# Patient Record
Sex: FEMALE | Race: ASIAN | Employment: FULL TIME | URBAN - METROPOLITAN AREA
[De-identification: names, ages, dates, MRNs, and addresses within clinical notes are randomized per-mention and may not be internally consistent; named-entity substitution may affect disease eponyms.]

---

## 2021-12-14 ENCOUNTER — OFFICE VISIT (OUTPATIENT)
Dept: OBGYN | Facility: CLINIC | Age: 21
End: 2021-12-14
Attending: MIDWIFE
Payer: COMMERCIAL

## 2021-12-14 VITALS — DIASTOLIC BLOOD PRESSURE: 71 MMHG | SYSTOLIC BLOOD PRESSURE: 110 MMHG | HEART RATE: 79 BPM | WEIGHT: 103 LBS

## 2021-12-14 DIAGNOSIS — Z30.017 INSERTION OF NEXPLANON: Primary | ICD-10-CM

## 2021-12-14 DIAGNOSIS — Z30.017 INSERTION OF IMPLANTABLE SUBDERMAL CONTRACEPTIVE: ICD-10-CM

## 2021-12-14 LAB
HCG UR QL: NEGATIVE
INTERNAL QC OK POCT: NORMAL
POCT KIT EXPIRATION DATE: NEGATIVE
POCT KIT LOT NUMBER: NEGATIVE

## 2021-12-14 PROCEDURE — 87591 N.GONORRHOEAE DNA AMP PROB: CPT | Performed by: MIDWIFE

## 2021-12-14 PROCEDURE — 87491 CHLMYD TRACH DNA AMP PROBE: CPT | Performed by: MIDWIFE

## 2021-12-14 PROCEDURE — 11981 INSERTION DRUG DLVR IMPLANT: CPT | Performed by: MIDWIFE

## 2021-12-14 PROCEDURE — G0463 HOSPITAL OUTPT CLINIC VISIT: HCPCS | Mod: 25

## 2021-12-14 PROCEDURE — 250N000011 HC RX IP 250 OP 636: Performed by: MIDWIFE

## 2021-12-14 PROCEDURE — 99202 OFFICE O/P NEW SF 15 MIN: CPT | Mod: 25 | Performed by: MIDWIFE

## 2021-12-14 PROCEDURE — 81025 URINE PREGNANCY TEST: CPT | Performed by: MIDWIFE

## 2021-12-14 RX ADMIN — ETONOGESTREL 68 MG: 68 IMPLANT SUBCUTANEOUS at 13:37

## 2021-12-14 NOTE — LETTER
12/14/2021       RE: Eula Lopez  [address] [address]     Dear Colleague,    Thank you for referring your patient, Eula Lopez, to the [facility] [place] at [facility]. Please see a copy of my visit note below.    Nexplanon Insertion:    Is a pregnancy test required: Yes.  Was it positive or negative?  Negative  Was a consent obtained?  Yes    Subjective: Eula Lopez is a 21 year old No obstetric history on file. presents for Nexplanon.    Patient has been given the opportunity to ask questions about all forms of birth control, including all options appropriate for Eula Lopez. Discussed that no method of birth control, except abstinence is 100% effective against pregnancy or sexually transmitted infection.     Eula Lopez understands she may have the Nexplanon removed at any time and it should be removed by a health care provider.    The entire insertion procedure was reviewed with the patient, including care after placement.    Patient's last menstrual period was 11/21/2021. Last sexual activity: not sexually active . No allergy to betadine or shellfish. Patient desires STD screening  HCG  NEG     /71   Pulse 79   Wt 46.7 kg (103 lb)   LMP 11/21/2021     PROCEDURE NOTE: -- Nexplanon Insertion    Reason for Insertion: contraception and crampy  menses      Patient was placed supine with left arm exposed.  Eric was made 8-10 cm above medial epicondyle and a guiding eric 4 cm above the first.  Arm was prepped with Betadine. Insertion point was anesthetized with 2 mL 1% lidocaine. After stretching the skin with thumb and index finger around the insertion site, skin punctured with the tip of the needle inserted at 30 degrees and then lowered to horizontal position. The needle was then advanced to its full length. Applicator was then stabilized and slider was unlocked. Slider was pulled  back until it stopped and then removed.    Correct placement of the implant was confirmed by palpation in the patient's arm and visualizing the purple top of the obturator.   Bandage and pressure dressing applied to insertion site.    Lot # [id number]  Exp: 11/19/23    EBL: minimal    Complications: none    ASSESSMENT:     ICD-10-CM    1. Insertion of Nexplanon  Z30.017 hCG qual urine POCT     Gonorrhea PCR     Chlamydia PCR (Clinic Collect)        PLAN:    Given 's handouts, including when to have Nexplanon removed, list of danger s/sx, side effects and follow up recommended. Encouraged condom use for prevention of STD. Back up contraception advised for 7 days. Advised to call for any fever, for prolonged or severe pain or bleeding, abnormal vaginal dischage. She was advised to use pain medications (ibuprofen) as needed for mild to moderate pain.     RENE Preciado CNM             Again, thank you for allowing me to participate in the care of your patient.      Sincerely,    RENE Preciado CNM

## 2021-12-14 NOTE — LETTER
Date:December 15, 2021      Patient was self referred, no letter generated. Do not send.        [facility] Health Information

## 2021-12-14 NOTE — PROGRESS NOTES
Nexplanon Insertion:    Is a pregnancy test required: Yes.  Was it positive or negative?  Negative  Was a consent obtained?  Yes    Subjective: Eula Lopez is a 21 year old No obstetric history on file. presents for Nexplanon.    Patient has been given the opportunity to ask questions about all forms of birth control, including all options appropriate for Eula Lopez. Discussed that no method of birth control, except abstinence is 100% effective against pregnancy or sexually transmitted infection.     Eula Lopez understands she may have the Nexplanon removed at any time and it should be removed by a health care provider.    The entire insertion procedure was reviewed with the patient, including care after placement.    Patient's last menstrual period was 11/21/2021. Last sexual activity: not sexually active . No allergy to betadine or shellfish. Patient desires STD screening  HCG  NEG     /71   Pulse 79   Wt 46.7 kg (103 lb)   LMP 11/21/2021     PROCEDURE NOTE: -- Nexplanon Insertion    Reason for Insertion: contraception and crampy  menses      Patient was placed supine with left arm exposed.  Eric was made 8-10 cm above medial epicondyle and a guiding eric 4 cm above the first.  Arm was prepped with Betadine. Insertion point was anesthetized with 2 mL 1% lidocaine. After stretching the skin with thumb and index finger around the insertion site, skin punctured with the tip of the needle inserted at 30 degrees and then lowered to horizontal position. The needle was then advanced to its full length. Applicator was then stabilized and slider was unlocked. Slider was pulled back until it stopped and then removed.    Correct placement of the implant was confirmed by palpation in the patient's arm and visualizing the purple top of the obturator.   Bandage and pressure dressing applied to insertion site.    Lot # [id number]  Exp: 11/19/23    EBL: minimal    Complications: none    ASSESSMENT:      ICD-10-CM    1. Insertion of Nexplanon  Z30.017 hCG qual urine POCT     Gonorrhea PCR     Chlamydia PCR (Clinic Collect)        PLAN:    Given 's handouts, including when to have Nexplanon removed, list of danger s/sx, side effects and follow up recommended. Encouraged condom use for prevention of STD. Back up contraception advised for 7 days. Advised to call for any fever, for prolonged or severe pain or bleeding, abnormal vaginal dischage. She was advised to use pain medications (ibuprofen) as needed for mild to moderate pain.     RENE PreciadoM

## 2021-12-15 LAB
C TRACH DNA SPEC QL NAA+PROBE: NEGATIVE
N GONORRHOEA DNA SPEC QL NAA+PROBE: NEGATIVE

## 2022-01-04 ENCOUNTER — TELEPHONE (OUTPATIENT)
Dept: OBGYN | Facility: CLINIC | Age: 22
End: 2022-01-04
Payer: COMMERCIAL

## 2022-01-04 NOTE — TELEPHONE ENCOUNTER
[facility] Call Center    Phone Message    May a detailed message be left on voicemail: yes     Reason for Call: Patient had nexplanon inserted on 12/14/21 and has some questions. Please reach out to patient.    Action Taken: Message routed to:  [facility]    Travel Screening: Not Applicable

## 2022-01-04 NOTE — TELEPHONE ENCOUNTER
Called and spoke with patient. She reports having spotting between cycles after Nexplanon implantation.    Discussed expectations regarding irregular cycles, changes in cycles, missed cycles for first several cycles after implantation. Reviewed bleeding precautions. Pt states spotting is very light. Pt denies any insertion site pain, s/s of infection. Encouraged patient to reach out with any other questions or concerns. Pt verbalized understanding and agreement.

## 2022-03-22 ENCOUNTER — TELEPHONE (OUTPATIENT)
Dept: OBGYN | Facility: CLINIC | Age: 22
End: 2022-03-22
Payer: COMMERCIAL

## 2022-03-22 NOTE — TELEPHONE ENCOUNTER
Patient has been having what feels like menstrual cramps for the past couple days. This is when her cycle would be due to start but she has no bleeding. Last month her period was very light and she had some spotting after. She describes the cramps as fairly mild and intermittent. Patient has been sexually active since nexplanon was placed so took a pregnancy test this AM and it was negative. Also notes acne has been worse recently.     Reviewed that nexplanon can cause irregular menstruation. Patient feels more assured now that she took UPT. Regarding acne, recommended seeing if it improves over time as other factors could be contributing as well and she is otherwise liking the nexplanon. Confirmed she can see a dermatologist and discuss medication options while on nexplanon, just recommended informing derm she has it.

## 2022-03-22 NOTE — TELEPHONE ENCOUNTER
[facility] Call Center    Phone Message    May a detailed message be left on voicemail: yes     Reason for Call: Other: Pt called and stated she has some questions regarding the Nexplanon regarding some side effects - pt wouldnt go into much detail about it but would like a call back - please call pt to further dsicuss, Thanks!     Action Taken: Message routed to:  Other: [facility]    Travel Screening: Not Applicable

## 2022-04-03 ENCOUNTER — HEALTH MAINTENANCE LETTER (OUTPATIENT)
Age: 22
End: 2022-04-03

## 2022-04-08 ENCOUNTER — OFFICE VISIT (OUTPATIENT)
Dept: OBGYN | Facility: CLINIC | Age: 22
End: 2022-04-08
Payer: COMMERCIAL

## 2022-04-08 VITALS
DIASTOLIC BLOOD PRESSURE: 69 MMHG | HEIGHT: 60 IN | SYSTOLIC BLOOD PRESSURE: 118 MMHG | HEART RATE: 95 BPM | OXYGEN SATURATION: 99 % | BODY MASS INDEX: 20.81 KG/M2 | WEIGHT: 106 LBS

## 2022-04-08 DIAGNOSIS — Z00.00 ANNUAL PHYSICAL EXAM: Primary | ICD-10-CM

## 2022-04-08 DIAGNOSIS — N94.6 DYSMENORRHEA: ICD-10-CM

## 2022-04-08 PROCEDURE — 99212 OFFICE O/P EST SF 10 MIN: CPT | Mod: 25 | Performed by: OBSTETRICS & GYNECOLOGY

## 2022-04-08 PROCEDURE — G0145 SCR C/V CYTO,THINLAYER,RESCR: HCPCS | Performed by: OBSTETRICS & GYNECOLOGY

## 2022-04-08 PROCEDURE — 99385 PREV VISIT NEW AGE 18-39: CPT | Performed by: OBSTETRICS & GYNECOLOGY

## 2022-04-08 NOTE — PROGRESS NOTES
Eula is a 22 year old No obstetric history on file. female who presents for annual exam.     Menses are regular q 28-30 days and normal lasting 3 days.  Menses flow: normal and light.  Patient's last menstrual period was 03/31/2022.. Using nexplanon for contraception.  She is not currently considering pregnancy.  Besides routine health maintenance, she has no other health concerns today .  Gets cramping before or after period.  Still gets period fairly regularly, but really light.  Has Nexplanon in place.  GYNECOLOGIC HISTORY:  Menarche: 11  Age at first intercourse: 20 Number of lifetime partners: 1  Eula is sexually active with 1male partner(s) and is currently in monogamous relationship with boyfriend.    History sexually transmitted infections:No STD history  STI testing offered?  Declined  KRISTIE exposure: No  History of abnormal Pap smear: NO - under age 21, PAP not appropriate for age  Family history of breast CA: No  Family history of uterine/ovarian CA: No    Family history of colon CA: No    HEALTH MAINTENANCE:  Cholesterol: (No results found for: CHOL History of abnormal lipids: No  Mammo:  . History of abnormal Mammo: Not applicable.  Regular Self Breast Exams: Yes  Calcium/Vitamin D intake: source:  dairy, dietary supplement(s) Adequate? Yes  TSH: (No results found for: TSH )  Pap; (No results found for: PAP )    HISTORY:  OB History   No obstetric history on file.     No past medical history on file.  No past surgical history on file.  No family history on file.  Social History     Socioeconomic History     Marital status: Single     Spouse name: None     Number of children: None     Years of education: None     Highest education level: None   Occupational History     None   Tobacco Use     Smoking status: Never Smoker     Smokeless tobacco: Never Used   Substance and Sexual Activity     Alcohol use: Yes     Comment: once a month     Drug use: Never     Sexual activity: Not Currently     Partners:  Male     Birth control/protection: None   Other Topics Concern     None   Social History Narrative    How much exercise per week? Daily walking    How much calcium per day? Through foods       How much caffeine per day? At least one cup coffee    How much vitamin D per day? Through foods    Do you/your family wear seatbelts?  Yes    Do you/your family use safety helmets? N/a    Do you/your family use sunscreen? Yes    Do you/your family keep firearms in the home? No    Do you/your family have a smoke detector(s)? Yes        Reviewed by Lia Li 12-14-21         Social Determinants of Health     Financial Resource Strain: Not on file   Food Insecurity: Not on file   Transportation Needs: Not on file   Physical Activity: Not on file   Stress: Not on file   Social Connections: Not on file   Intimate Partner Violence: Not on file   Housing Stability: Not on file       Current Outpatient Medications:      etonogestrel (NEXPLANON) 68 MG IMPL, 1 each (68 mg) by Subdermal route once, Disp: , Rfl:    No Known Allergies    Past medical, surgical, social and family history were reviewed and updated in EPIC.    ROS:   C:     NEGATIVE for fever, chills, change in weight  I:       NEGATIVE for worrisome rashes, moles or lesions  E:     NEGATIVE for vision changes or irritation  E/M: NEGATIVE for ear, mouth and throat problems  R:     NEGATIVE for significant cough or SOB  CV:   NEGATIVE for chest pain, palpitations or peripheral edema  GI:     NEGATIVE for nausea, abdominal pain, heartburn, or change in bowel habits  :   NEGATIVE for frequency, dysuria, hematuria, vaginal discharge, or irregular bleeding  M:     NEGATIVE for significant arthralgias or myalgia  N:      NEGATIVE for weakness, dizziness or paresthesias  E:      NEGATIVE for temperature intolerance, skin/hair changes  P:      NEGATIVE for changes in mood or affect.    EXAM:  /69   Pulse 95   Ht 1.524 m (5')   Wt 48.1 kg (106 lb)   LMP 03/31/2022    SpO2 99%   Breastfeeding No   BMI 20.70 kg/m     BMI: Body mass index is 20.7 kg/m .  Constitutional: healthy, alert and no distress  Head: Normocephalic. No masses, lesions, tenderness or abnormalities  Neck: Neck supple. Trachea midline. No adenopathy. Thyroid symmetric, normal size.   Cardiovascular: RRR.   Respiratory: Negative.   Breast: No nodularity, asymmetry or nipple discharge bilaterally.  Gastrointestinal: Abdomen soft, non-tender, non-distended. No masses, organomegaly.  :  Vulva:  No external lesions, normal female hair distribution, no inguinal adenopathy.    Urethra:  Midline, non-tender, well supported, no discharge  Vagina:  Moist, pink, no abnormal discharge, no lesions  Uterus:  Normal size , non-tender, freely mobile  Ovaries:  No masses appreciated, non-tender, mobile  Rectal Exam: deferred  Musculoskeletal: extremities normal  Skin: no suspicious lesions or rashes  Psychiatric: Affect appropriate, cooperative,mentation appears normal.     COUNSELING:   Reviewed preventive health counseling, as reflected in patient instructions   reports that she has never smoked. She has never used smokeless tobacco.    Body mass index is 20.7 kg/m .    FRAX Risk Assessment    ASSESSMENT:  22 year old female with satisfactory annual exam  (Z00.00) Annual physical exam  (primary encounter diagnosis)  Comment: Normal exam today.  Routine pap  Plan: Pap screen only - recommended age 21 - 24 years           (N94.6) Dysmenorrhea  Comment: Will evaluate for structural causes of her abnormal uterine bleeding.  Plan: US Transvaginal Pelvic Non-OB           Alexa Matamoros MD

## 2022-04-12 LAB
BKR LAB AP GYN ADEQUACY: NORMAL
BKR LAB AP GYN INTERPRETATION: NORMAL
BKR LAB AP HPV REFLEX: NO
BKR LAB AP PREVIOUS ABNORMAL: NORMAL
PATH REPORT.COMMENTS IMP SPEC: NORMAL
PATH REPORT.COMMENTS IMP SPEC: NORMAL
PATH REPORT.RELEVANT HX SPEC: NORMAL

## 2022-10-03 ENCOUNTER — HEALTH MAINTENANCE LETTER (OUTPATIENT)
Age: 22
End: 2022-10-03

## 2023-05-20 ENCOUNTER — HEALTH MAINTENANCE LETTER (OUTPATIENT)
Age: 23
End: 2023-05-20

## 2023-08-04 ENCOUNTER — OFFICE VISIT (OUTPATIENT)
Dept: MIDWIFE SERVICES | Facility: CLINIC | Age: 23
End: 2023-08-04
Payer: COMMERCIAL

## 2023-08-04 VITALS — DIASTOLIC BLOOD PRESSURE: 75 MMHG | SYSTOLIC BLOOD PRESSURE: 125 MMHG | WEIGHT: 117 LBS | BODY MASS INDEX: 22.85 KG/M2

## 2023-08-04 DIAGNOSIS — Z30.46 ENCOUNTER FOR NEXPLANON REMOVAL: Primary | ICD-10-CM

## 2023-08-04 PROCEDURE — 11982 REMOVE DRUG IMPLANT DEVICE: CPT | Performed by: ADVANCED PRACTICE MIDWIFE

## 2023-08-04 ASSESSMENT — PATIENT HEALTH QUESTIONNAIRE - PHQ9: SUM OF ALL RESPONSES TO PHQ QUESTIONS 1-9: 1

## 2023-08-04 NOTE — PROGRESS NOTES
Eula presents to the clinic for nexplanon removal r/t bloating and weight gain. She would like to switch to a paragard IUD but would like to take 1 month off of birth control.     Nexplanon Removal:     Is a pregnancy test required: No.  Was a consent obtained?  Yes    Eula Lopez is here for removal of etonogestrel implant Nexplanon/Implanon    Indication: Possibly some GI side effects      Preoperative Diagnosis: etonogestrel implant  Postoperative Diagnosis: etonogestrel implant removed    Technique: On the left arm  Skin prep Betadine  Anesthesia 1% lidocaine  Procedure: Small incision (<5mm) was made at distal end of palpable implant, curved hemostat or mosquito forceps was used to isolate the implant and bring it to the incision, the fibrous capsule containing the implant  was incised and the Implant was removed intact.      EBL: minimal  Complications:  No  Tolerance:  Pt tolerated procedure well and was in stable condition.   Dressing:    A pressure bandage was placed for the next 12-24 hours.    Contraception was discussed and patient chose the following method Parguard IUD      Follow up: Pt was instructed to call if bleeding, severe pain or foul smell.     Stephanie Dixon, JIAM, RENE ROM

## 2023-08-04 NOTE — NURSING NOTE
Chief Complaint   Patient presents with    Contraception     Patient had Nexplanon inserted 2021, and would like to discuss having it removed due to side effects. Patient reports increased weight gain, and bloating. Other than that- patient loves the Nexplanon and has no issues. 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        Initial /75   Wt 53.1 kg (117 lb)   BMI 22.85 kg/m   Estimated body mass index is 22.85 kg/m  as calculated from the following:    Height as of 22: 1.524 m (5').    Weight as of this encounter: 53.1 kg (117 lb).  BP completed using cuff size: regular    Questioned patient about current smoking habits.  Pt. has never smoked.          The following HM Due: NONE    Tiffanie Murry CMA

## 2023-08-18 ENCOUNTER — OFFICE VISIT (OUTPATIENT)
Dept: MIDWIFE SERVICES | Facility: CLINIC | Age: 23
End: 2023-08-18
Payer: COMMERCIAL

## 2023-08-18 VITALS
BODY MASS INDEX: 22.78 KG/M2 | SYSTOLIC BLOOD PRESSURE: 122 MMHG | HEIGHT: 60 IN | WEIGHT: 116 LBS | DIASTOLIC BLOOD PRESSURE: 75 MMHG

## 2023-08-18 DIAGNOSIS — Z01.812 PRE-PROCEDURE LAB EXAM: ICD-10-CM

## 2023-08-18 DIAGNOSIS — Z01.812 PRE-PROCEDURE LAB EXAM: Primary | ICD-10-CM

## 2023-08-18 DIAGNOSIS — Z30.430 ENCOUNTER FOR INSERTION OF INTRAUTERINE CONTRACEPTIVE DEVICE: Primary | ICD-10-CM

## 2023-08-18 LAB — HCG UR QL: NEGATIVE

## 2023-08-18 PROCEDURE — 58300 INSERT INTRAUTERINE DEVICE: CPT | Mod: 52 | Performed by: ADVANCED PRACTICE MIDWIFE

## 2023-08-18 PROCEDURE — 81025 URINE PREGNANCY TEST: CPT | Performed by: ADVANCED PRACTICE MIDWIFE

## 2023-08-18 RX ORDER — COPPER 313.4 MG/1
1 INTRAUTERINE DEVICE INTRAUTERINE ONCE
Status: DISCONTINUED
Start: 2023-08-18 | End: 2023-08-18

## 2023-08-18 RX ORDER — COPPER 313.4 MG/1
1 INTRAUTERINE DEVICE INTRAUTERINE ONCE
COMMUNITY
End: 2023-08-18

## 2023-08-18 NOTE — PROGRESS NOTES
IUD Insertion:      Subjective: Eula Lopez is a 23 year old  presents for IUD and desires Paragard type IUD.    Is a pregnancy test required: Yes.  Was it positive or negative?  Negative  Was a consent obtained?  Yes    Patient has been given the opportunity to ask questions about all forms of birth control, including all options appropriate for Eula Lopez. Discussed that no method of birth control, except abstinence is 100% effective against pregnancy or sexually transmitted infection.     Eula Lopez understands she may have the IUD removed at any time. IUD should be removed by a health care provider.    The entire insertion procedure was reviewed with the patient, including care after placement.    No LMP recorded. Has current contraception. No allergy to betadine or shellfish. Patient declines STD screening  HCG Qual Urine   Date Value Ref Range Status   2021 Negative Negative Final       /75   Ht 1.524 m (5')   Wt 52.6 kg (116 lb)   BMI 22.65 kg/m      Pelvic Exam:   EG/BUS: normal genital architecture without lesions, erythema or abnormal secretions.   Vagina: moist, pink, rugae with physiologic discharge and secretions  Cervix: nulliparous no lesions and pink, moist, closed, without lesion or CMT  Uterus: mid position, mobile, no pain  Adnexa: within normal limits and no masses, nodularity, tenderness    PROCEDURE NOTE: -- IUD Insertion    Reason for Insertion: contraception       Under sterile technique, cervix was visualized with speculum and prepped with Betadine solution swab x 3. Tenaculum was placed for stability. The uterus was gently straightened and I was unable to pass sound through cervix. Attempted EB stylet which was unable to be passed through the inner cervical os. Procedure stopped and unsuccessful.  Patient tolerated procedure well.    EBL: minimal    Complications: none    ASSESSMENT:     ICD-10-CM    1. Encounter for insertion of intrauterine  contraceptive device  Z30.430       2. Pre-procedure lab exam  Z01.812 HCG Qual, Urine ([id number])           PLAN:    (Z30.430) Encounter for insertion of intrauterine contraceptive device  (primary encounter diagnosis)  Comment: unsuccesful IUD insertion attempt  Plan: Plan to RTC when on period and cervix is open to try Paragard placement again.     (Z01.812) Pre-procedure lab exam  Comment:   Plan: HCg negative      Stephanie Dixon, JANAE, APRN CNM

## 2023-08-25 ENCOUNTER — TELEPHONE (OUTPATIENT)
Dept: NURSING | Facility: CLINIC | Age: 23
End: 2023-08-25
Payer: COMMERCIAL

## 2023-08-25 DIAGNOSIS — Z30.430 ENCOUNTER FOR INTRAUTERINE DEVICE PLACEMENT: Primary | ICD-10-CM

## 2023-08-25 NOTE — TELEPHONE ENCOUNTER
Pt was calling because she was told to call when she got her period.  Her period is late and was wondering if she could just get in to do a US guided IUD placement.  Pt advised that that would take some time to get scheduled.  This writer will send message to scheduling to try to get pt appointment time.

## 2023-09-07 ENCOUNTER — OFFICE VISIT (OUTPATIENT)
Dept: MIDWIFE SERVICES | Facility: CLINIC | Age: 23
End: 2023-09-07
Payer: COMMERCIAL

## 2023-09-07 ENCOUNTER — TELEPHONE (OUTPATIENT)
Dept: MIDWIFE SERVICES | Facility: CLINIC | Age: 23
End: 2023-09-07

## 2023-09-07 VITALS
WEIGHT: 118.8 LBS | HEART RATE: 92 BPM | DIASTOLIC BLOOD PRESSURE: 75 MMHG | SYSTOLIC BLOOD PRESSURE: 118 MMHG | OXYGEN SATURATION: 99 % | BODY MASS INDEX: 23.2 KG/M2

## 2023-09-07 DIAGNOSIS — Z30.430 ENCOUNTER FOR IUD INSERTION: Primary | ICD-10-CM

## 2023-09-07 PROCEDURE — 58300 INSERT INTRAUTERINE DEVICE: CPT | Performed by: ADVANCED PRACTICE MIDWIFE

## 2023-09-07 RX ORDER — COPPER 313.4 MG/1
1 INTRAUTERINE DEVICE INTRAUTERINE ONCE
Status: COMPLETED
Start: 2023-09-07 | End: 2023-09-07

## 2023-09-07 RX ORDER — COPPER 313.4 MG/1
1 INTRAUTERINE DEVICE INTRAUTERINE ONCE
COMMUNITY

## 2023-09-07 RX ADMIN — COPPER 1 EACH: 313.4 INTRAUTERINE DEVICE INTRAUTERINE at 15:35

## 2023-09-07 NOTE — PATIENT INSTRUCTIONS
What Paragard Users May Expect    What to watch for right after Paragard is placed  Some women may experience uterine cramps, bleeding, and/or dizziness during and right after Paragard is placed. To help minimize the cramps, you may taken ibuprofen 600 mg with food prior to your appointment. These symptoms should improve over the next 24 hours.  Mild cramping may be present for a few days after your placement  As a follow up, you should check your strings in 4 weeks, or visit your clinic once in the first 4 to 12 weeks after Paragard is placed to make sure it is in the right position. After that, Paragard can be checked once a year as part of your routine exam.    Please use a back-up method (abstinence or condoms) for 5 days after placement.    Your periods may change  Some women may have heavier and longer periods with cramping for a while; some may have spotting between periods. These side effects usually lessen after a few months. However, if your menstrual flow is severe or prolonged, call your healthcare professional. You should also call your healthcare professional if you miss a period.    Paragard Strings  You may check your own Paragard strings by inserting a finger into the vagina and feeling the strings as they exit the cervix.  The strings will initially feel firm, but will soften over a few weeks.  After the strings have softened, you or your partner should not be able to feel the strings during intercourse.  If you can feel the IUD, see your healthcare provider to have the position confirmed.  You may continue to use tampons with the IUD in place.    Paragard does not protect against HIV or STDs.  Paragard does not prevent the formation of ovarian cysts.  Paragard does not typically reduce acne or cause weight gain or mood changes.    Please call [facility] at [phone] if you have questions or concerns.    For more information:  [email or web address]

## 2023-09-07 NOTE — TELEPHONE ENCOUNTER
"Patient calling as she got her period and was told to call the clinic when she did to get in to be seen by the on-call JIAM for another attempt at IUD insertion. Previous clinic appointment on 8/18 it was unsuccessful x3. Does have US guided iud placement set up for 10/4 but ideally would like it in asap.     RN spoke to on-call JANAE Soto and she is willing to see the patient today and attempt IUD insertion.     The earliest the patent is able to come in to the clinic is 3pm. She is aware that the provider is on call and something could come up. Patient has some availability tomorrow if needs to reschedule.  Her flow is moderate, \"more than spotting and not heavy like flowing out of her\"    RADHA Mckay  "

## 2023-09-07 NOTE — PROGRESS NOTES
IUD Insertion:  CONSULT:    Is a pregnancy test required: No.  Was a consent obtained?  Yes    Subjective: Eula Lopez is a 23 year old  presents for IUD and desires Paragard type IUD. She attempted with a different clinician, but started her menses today, so would like to try again.     Patient has been given the opportunity to ask questions about all forms of birth control, including all options appropriate for Eula Lopez. Discussed that no method of birth control, except abstinence is 100% effective against pregnancy or sexually transmitted infection.     Eula Lopez understands she may have the IUD removed at any time. IUD should be removed by a health care provider.    The entire insertion procedure was reviewed with the patient, including care after placement.    Patient's last menstrual period was 2023 (exact date). . No allergy to betadine or shellfish. Patient declines STD screening  HCG Qual Urine   Date Value Ref Range Status   2021 Negative Negative Final     hCG Urine Qualitative   Date Value Ref Range Status   2023 Negative Negative Final     Comment:     This test is for screening purposes.  Results should be interpreted along with the clinical picture.  Confirmation testing is available if warranted by ordering [id number], HCG Quantitative Pregnancy.         /75   Pulse 92   Wt 53.9 kg (118 lb 12.8 oz)   LMP 2023 (Exact Date)   SpO2 99%   BMI 23.20 kg/m      Pelvic Exam:   EG/BUS: normal genital architecture without lesions, erythema or abnormal secretions.   Vagina: moist, pink, rugae with physiologic discharge and secretions  Cervix: nulliparous no lesions and pink, moist, closed, without lesion or CMT  Uterus: antevertedposition, mobile, no pain  Adnexa: within normal limits and no masses, nodularity, tenderness    PROCEDURE NOTE: -- IUD Insertion    Reason for Insertion: contraception    Premedicated with ibuprofen.  Under sterile  technique, cervix was visualized with speculum and prepped with Betadine solution swab x 3. Tenaculum was placed for stability. The uterus was gently straightened and sounded to 7.0 cm. IUD prepared for placement, and IUD inserted according to 's instructions without difficulty or significant resitance, and deployed at the fundus. The strings were visualized and trimmed to 2.0 cm from the external os. Tenaculum was removed and hemostasis noted. Speculum removed.  Patient tolerated procedure well.    Lot # /Exp: see MAR    EBL: minimal    Complications: none    ASSESSMENT:     ICD-10-CM    1. Encounter for IUD insertion  Z30.430 paragard intrauterine copper IUD device 1 each     INSERTION INTRAUTERINE DEVICE     CANCELED: HCG Qual, Urine ([id number])             PLAN:    Given 's handouts, including when to have IUD removed, list of danger s/sx, side effects and follow up recommended. Encouraged condom use for prevention of STD. Back up contraception advised for 7 days if progestin method. Advised to call for any fever, for prolonged or severe pain or bleeding, abnormal vaginal discharge, or unable to palpate strings. She was advised to use pain medications (ibuprofen) as needed for mild to moderate pain. Advised to follow-up in clinic in 4-6 weeks for IUD string check if unable to find strings or as directed by provider.     Charles Corrales CNM

## 2023-09-27 ENCOUNTER — OFFICE VISIT (OUTPATIENT)
Dept: MIDWIFE SERVICES | Facility: CLINIC | Age: 23
End: 2023-09-27
Payer: COMMERCIAL

## 2023-09-27 VITALS
HEART RATE: 90 BPM | OXYGEN SATURATION: 99 % | BODY MASS INDEX: 22.26 KG/M2 | WEIGHT: 114 LBS | DIASTOLIC BLOOD PRESSURE: 74 MMHG | SYSTOLIC BLOOD PRESSURE: 117 MMHG

## 2023-09-27 DIAGNOSIS — Z30.431 ENCOUNTER FOR ROUTINE CHECKING OF INTRAUTERINE CONTRACEPTIVE DEVICE (IUD): Primary | ICD-10-CM

## 2023-09-27 PROCEDURE — 99212 OFFICE O/P EST SF 10 MIN: CPT | Performed by: ADVANCED PRACTICE MIDWIFE

## 2023-09-27 NOTE — PROGRESS NOTES
SUBJECTIVE:  Eula is a 23 year old  who is here today for an IUD check. Eula had a paragard inserted . Since then, she has not had any bleeding. She was having sex recently and during felt a sharp cramp in her uterus. The pain resolved after she stopped having intercourse. Since then, she has not had any pain again, she has had sex since that instance. She just wants to check to make sure her strings are present and that the cervix looks normal.    OBJECTIVE:  /74   Pulse 90   Wt 51.7 kg (114 lb)   LMP 2023 (Exact Date)   SpO2 99%   BMI 22.26 kg/m    Exam:  Constitutional: healthy, alert, and no distress  Psychiatric: mentation appears normal and affect normal/bright  Pelvic Exam:  Vulva: No external lesions, normal hair distribution, no adenopathy  Vagina: Moist, pink, no abnormal discharge, well rugated, no lesions  Cervix: nulparous, smooth, pink, no visible lesions. IUD strings visualized  Rectal exam: deferred      ASSESSMENT/PLAN:    (Z30.431) Encounter for routine checking of intrauterine contraceptive device (IUD)  (primary encounter diagnosis)  Comment: IUD in place  Plan: Reviewed warning s/s of displaced IUD. If Eula has any abnormal bleeding or severe uterine pain, should call clinic for an order for a transvaginal US to check IUD placement.    Blanche DELUCA    I was present with the JIAM student who participated in the service and in the documentation of the services provided. I have verified the history and personally performed the physical exam and medical decision making, as documented by the student and edited by me.     Stephanie Dixon, JANAE, APRN JANAE CARDOSO

## 2024-10-05 ENCOUNTER — HEALTH MAINTENANCE LETTER (OUTPATIENT)
Age: 24
End: 2024-10-05